# Patient Record
(demographics unavailable — no encounter records)

---

## 2025-02-17 NOTE — RESULTS/DATA
[FreeTextEntry1] : BREAST PATH/RAD REVIEW 9/16/2019 L Dx MG/US: BIRADs 3, Heterogeneously dense - L upper anterior/middle depth scattered grouped micocalcs, round and amorphous, prob benign (Rec 6-month f/u L Dx MG)  - R 12N2 1 cm lobulated mass vs complicated cyst, L 11N1 0.5 cm septated complicated cyst - both probably benign (Rec 6-month f/u BL Dx US)   11/11/2020 BL SC MG/US: BIRADs 2, Stable R medial focal asymmetry, R lateral bx clip marking location of prior benign image guided bx, Stable R 12N2 8 mm mass, no LAD. 11/15/2021 BL SC MG/US: BIRADs 2, L 11N1 0.4 cm cyst, Stable R 12N2 0.8 cm mass, no lymphadenopathy  01/23/2023 BL Dx MG: BIRADs 0, L UOQ middle depth group of calcs (Rec L dx MG and BL Dx US given dense breast)  02/13/2023 L Dx MG/ USL BIRADs 4C, L UOQ- mid depth is a cluster of pleomorphic micocalcs  (Rec Stereo bx)    2/27/2023 L Stereo bx:  1. L UOQ core biopsy: High grade extensive DCIS associated w/ DCIS, papilloma with focal DCIS, papillary apocrine metaplasia and UDH. Concordant, ER 10-15%, [place] 0%. Top Hat Clip.  ** Note: Core biopsies show extensive DCIS w/ cancerization of the lobules and surrounding dense chronic inflammatory infiltrate. There are areas suspicious but not definite for microinvasive carcinoma.   3/17/2023 Breast MRI: - L UIQ biopsy marker w/ associated NME measuring 3.0 cm. Clip at the posterior extent of the NME. (Rec wide surgical excision to include residual calcs)  - No additional suspicious enhancement in left breast, No BL significant axillary or IM LAD.   3/31/2023 s/p L dami Loc PM, L SLNBx (0/2) - PT1mi, pN0 Path (3:00): 1 mm microinvasive carcinoma associated with extensive DCIS. Clear margins.    9/18/2023 L Dx MG/US: BIRADs 2.  HD. New Left 3N2 post-surgical changes, previously seen grouped calcs have been removed. Several benign-type dystrophic calcs seen.   2/5/24 BL Dx MG/US: Birads 2. HD.  - Stable L 3N2 and axillary post-surgical changes.  - Stable R bx clip. Stable R 12N2 0.7 cm hypoechoic nodule, R 2N5 0.9 cm hypoechoic nodule that correlates w/ nodule on MG stable since 2020.   2/6/25 BL Dx MG/US: birads 2. SFGD.  - Stable L post-surgical changes.  - Stable R bx marker. Stable R 12N2 0.9 cm mass since 2021, Stable 2N5 0.9 cm mass since 2024.

## 2025-02-17 NOTE — ASSESSMENT
[FreeTextEntry1] : 64 y/o F w/ left breast DCIS w/ microinvasion +/- dx'ed in 2/2023>pT1mi, pN. s/p L lumpectomy & SLNB. s/p XRT on anastrozole. Here for follow-up.   Age at Presentation: 61 Procedure:  L lumpectomy, L SLNBx 3/31/2023  Pathology: <1 mm microinvasive carcinoma associated with extensive DCIS, clear margins; 0/2 LN Genetics: N/A  Stage: pT1mi pN.   We discussed her clinical breast exam today is unremarkable with no clinical evidence of disease.   I reviewed her most recent breast imaging with no significant findings. - Medical Oncology (Dr. Ashley): On anastrozole for 5 yrs - Radiation Oncology (Dr. Gamino): Completed adjuvant radiation treatment. - Next imaging: B/L Dx MG/US due February 2026 - RTO 1 year. She knows to return sooner if any breast imaging abnormalities or if any breast issues/concerns arise.     complains of pain/discomfort

## 2025-02-17 NOTE — HISTORY OF PRESENT ILLNESS
[de-identified] : 64 y/o F w/ left breast DCIS w/ microinvasion +/- dx'ed in 2023>pT1mi, pN. s/p L lumpectomy & SLNB. s/p XRT on anastrozole. Here for follow-up.    3/31/23 s/p L dami Loc PM, SLNBx (0/2)- L 3:00 microinvasive carcinoma (1 mm) associated with extensive DCIS. Clear margins.   23 - 23 completed adjuvant RT to Left breast with 4240 CGY given in 16 fractions w/ Dr. Allen Gamino. 23 Anastrozole started.  24:    No breast complaints.  tolerating anastrazole.   24 Here for 6-month follow-up. Annual imaging on 2024 birads 2. No new breast complaints.  No systemic symptoms of recurrence.  25 Since last visit has undergone a BL Dx MG/US rated birads 2.   no breast complaints.    Referred by: Dr Anna Ace GYN  PMHx: HTN, Pre-diabetic, HLD  PSHx: BL tubes tied  later had  Hysterectomy  2/2 fibroids  Meds: Simvastatin, Losartan 50 mg QD. Metformin 1,000 mg QD  Allergies: PCN- Hives  Family Hx: No family hx of breast or ovarian cancer  GYN: , menarche age 12, surgical menopause ay 39   Y (1 yrs for each)  Bra size: 34 B  Occupation: Home attendant

## 2025-02-17 NOTE — PHYSICAL EXAM
[Normocephalic] : normocephalic [Atraumatic] : atraumatic [Supple] : supple [No Supraclavicular Adenopathy] : no supraclavicular adenopathy [No Cervical Adenopathy] : no cervical adenopathy [Examined in the supine and seated position] : examined in the supine and seated position [Symmetrical] : symmetrical [No Axillary Lymphadenopathy] : no left axillary lymphadenopathy [Full ROM] : full range of motion [No Rashes] : no rashes [No Ulceration] : no ulceration [de-identified] : Normal respiratory effort [de-identified] : Left breast and axillary surgical incision are well-healed, no parenchymal defect. No palpable mass in either breast. No clinical lymphadenopathy.

## 2025-02-17 NOTE — ASSESSMENT
[FreeTextEntry1] : 64 y/o F w/ left breast DCIS w/ microinvasion +/- dx'ed in 2/2023>pT1mi, pN. s/p L lumpectomy & SLNB. s/p XRT on anastrozole. Here for follow-up.   Age at Presentation: 61 Procedure:  L lumpectomy, L SLNBx 3/31/2023  Pathology: <1 mm microinvasive carcinoma associated with extensive DCIS, clear margins; 0/2 LN Genetics: N/A  Stage: pT1mi pN.   We discussed her clinical breast exam today is unremarkable with no clinical evidence of disease.   I reviewed her most recent breast imaging with no significant findings. - Medical Oncology (Dr. Ashley): On anastrozole for 5 yrs - Radiation Oncology (Dr. Gamino): Completed adjuvant radiation treatment. - Next imaging: B/L Dx MG/US due February 2026 - RTO 1 year. She knows to return sooner if any breast imaging abnormalities or if any breast issues/concerns arise.

## 2025-02-17 NOTE — HISTORY OF PRESENT ILLNESS
[de-identified] : 62 y/o F w/ left breast DCIS w/ microinvasion +/- dx'ed in 2023>pT1mi, pN. s/p L lumpectomy & SLNB. s/p XRT on anastrozole. Here for follow-up.    3/31/23 s/p L dami Loc PM, SLNBx (0/2)- L 3:00 microinvasive carcinoma (1 mm) associated with extensive DCIS. Clear margins.   23 - 23 completed adjuvant RT to Left breast with 4240 CGY given in 16 fractions w/ Dr. Allen Gamino. 23 Anastrozole started.  24:    No breast complaints.  tolerating anastrazole.   24 Here for 6-month follow-up. Annual imaging on 2024 birads 2. No new breast complaints.  No systemic symptoms of recurrence.  25 Since last visit has undergone a BL Dx MG/US rated birads 2.   no breast complaints.    Referred by: Dr Anna Ace GYN  PMHx: HTN, Pre-diabetic, HLD  PSHx: BL tubes tied  later had  Hysterectomy  2/2 fibroids  Meds: Simvastatin, Losartan 50 mg QD. Metformin 1,000 mg QD  Allergies: PCN- Hives  Family Hx: No family hx of breast or ovarian cancer  GYN: , menarche age 12, surgical menopause ay 39   Y (1 yrs for each)  Bra size: 34 B  Occupation: Home attendant

## 2025-02-17 NOTE — RESULTS/DATA
[FreeTextEntry1] : BREAST PATH/RAD REVIEW 9/16/2019 L Dx MG/US: BIRADs 3, Heterogeneously dense - L upper anterior/middle depth scattered grouped micocalcs, round and amorphous, prob benign (Rec 6-month f/u L Dx MG)  - R 12N2 1 cm lobulated mass vs complicated cyst, L 11N1 0.5 cm septated complicated cyst - both probably benign (Rec 6-month f/u BL Dx US)   11/11/2020 BL SC MG/US: BIRADs 2, Stable R medial focal asymmetry, R lateral bx clip marking location of prior benign image guided bx, Stable R 12N2 8 mm mass, no LAD. 11/15/2021 BL SC MG/US: BIRADs 2, L 11N1 0.4 cm cyst, Stable R 12N2 0.8 cm mass, no lymphadenopathy  01/23/2023 BL Dx MG: BIRADs 0, L UOQ middle depth group of calcs (Rec L dx MG and BL Dx US given dense breast)  02/13/2023 L Dx MG/ USL BIRADs 4C, L UOQ- mid depth is a cluster of pleomorphic micocalcs  (Rec Stereo bx)    2/27/2023 L Stereo bx:  1. L UOQ core biopsy: High grade extensive DCIS associated w/ DCIS, papilloma with focal DCIS, papillary apocrine metaplasia and UDH. Concordant, ER 10-15%, [place] 0%. Top Hat Clip.  ** Note: Core biopsies show extensive DCIS w/ cancerization of the lobules and surrounding dense chronic inflammatory infiltrate. There are areas suspicious but not definite for microinvasive carcinoma.   3/17/2023 Breast MRI: - L UIQ biopsy marker w/ associated NME measuring 3.0 cm. Clip at the posterior extent of the NME. (Rec wide surgical excision to include residual calcs)  - No additional suspicious enhancement in left breast, No BL significant axillary or IM LAD.   3/31/2023 s/p L dami Loc PM, L SLNBx (0/2) - PT1mi, pN0 Path (3:00): 1 mm microinvasive carcinoma associated with extensive DCIS. Clear margins.    9/18/2023 L Dx MG/US: BIRADs 2.  HD. New Left 3N2 post-surgical changes, previously seen grouped calcs have been removed. Several benign-type dystrophic calcs seen.   2/5/24 BL Dx MG/US: Birads 2. HD.  - Stable L 3N2 and axillary post-surgical changes.  - Stable R bx clip. Stable R 12N2 0.7 cm hypoechoic nodule, R 2N5 0.9 cm hypoechoic nodule that correlates w/ nodule on MG stable since 2020.   2/6/25 BL Dx MG/US: birads 2. SFGD.  - Stable L post-surgical changes.  - Stable R bx marker. Stable R 12N2 0.9 cm mass since 2021, Stable 2N5 0.9 cm mass since 2024.

## 2025-02-26 NOTE — HISTORY OF PRESENT ILLNESS
[FreeTextEntry1] : 63 year old woman  with microinvasive left breast cancer, s/p lumpectomy/SLNB followed by adjuvant RT completed 6/7/23. She continues on anastrozole.   Interval history  breast pain, breast edema, pruritus, arm edema, fatigue & weight loss   2/6/25 bilateral diagnostic mammogram US : IMPRESSION: No mammographic or sonographic evidence of malignancy.  RECOMMENDATION:  Mammography in 1 year.  BI-RADS 2 - Benign Finding(s)  Care team  med Onc Loli Ashley GYN Anna Ace  surgeon Sylvia Reyes

## 2025-02-26 NOTE — DISEASE MANAGEMENT
[Pathological] : TNM Stage: p [FreeTextEntry4] : left breast ER+/DE- [TTNM] : 1mi [NTNM] : 0 [MTNM] : 0 [IA] : IA

## 2025-03-25 NOTE — REASON FOR VISIT
[Language Line ] : provided by Language Line   [Time Spent: ____ minutes] : Total time spent using  services: [unfilled] minutes. The patient's primary language is not English thus required  services. [Interpreters_IDNumber] : [id number] [Interpreters_FullName] : Gatito [TWNoteComboBox1] : Nauruan

## 2025-03-25 NOTE — PHYSICAL EXAM
[Sclera] : the sclera and conjunctiva were normal [Extraocular Movements] : extraocular movements were intact [Outer Ear] : the ears and nose were normal in appearance [Breast Appearance] : normal in appearance [Symmetric] : breasts are symmetric [Breast Palpation Mass] : no palpable masses [No UE Edema] : there is no upper extremity edema [Cervical Lymph Nodes Enlarged Anterior Bilaterally] : anterior cervical [Supraclavicular Lymph Nodes Enlarged Bilaterally] : supraclavicular [Axillary Lymph Nodes Enlarged Bilaterally] : axillary [Range of Motion to Joints] : range of motion to joints [Normal] : no focal deficits

## 2025-03-25 NOTE — REASON FOR VISIT
[Language Line ] : provided by Language Line   [Time Spent: ____ minutes] : Total time spent using  services: [unfilled] minutes. The patient's primary language is not English thus required  services. [Interpreters_IDNumber] : [id number] [Interpreters_FullName] : Gatito [TWNoteComboBox1] : Rwandan

## 2025-03-25 NOTE — HISTORY OF PRESENT ILLNESS
[FreeTextEntry1] : 63 year old woman  with microinvasive left breast cancer, s/p lumpectomy/SLNB followed by adjuvant RT completed 6/7/23. She continues on anastrozole.  Interval history: Notes intermittent pulling sensation left breast.  No restrictions in range of motion.  Continues with stretching exercises.  No UE swelling noted.  No fatigue. Occasional arthralgias in hands and low back.  No myalgias or bothersome hot flashes. No unintentional weight changes.  2/6/25 bilateral diagnostic mammogram US showed no mammographic or sonographic evidence of malignancy.  BI-RADS 2.  Care team Med Onc Loli Ashley GYN Anna Ace  Breast Surgeon Sylvia Reyes

## 2025-05-09 NOTE — PHYSICAL EXAM
[Chaperoned Physical Exam] : A chaperone was present in the examining room during all aspects of the physical examination. [MA] : MA [FreeTextEntry2] : Patria [Appropriately responsive] : appropriately responsive [Alert] : alert [No Acute Distress] : no acute distress [No Lymphadenopathy] : no lymphadenopathy [Regular Rate Rhythm] : regular rate rhythm [No Murmurs] : no murmurs [Clear to Auscultation B/L] : clear to auscultation bilaterally [Soft] : soft [Non-tender] : non-tender [Non-distended] : non-distended [No HSM] : No HSM [No Lesions] : no lesions [No Mass] : no mass [Oriented x3] : oriented x3 [FreeTextEntry6] : Radiation effect on the left breast. [Examination Of The Breasts] : a normal appearance [No Masses] : no breast masses were palpable [Vulvar Atrophy] : vulvar atrophy [Labia Majora] : normal [Labia Minora] : normal [Atrophy] : atrophy [Absent] : absent [Normal] : normal [Uterine Adnexae] : normal

## 2025-05-09 NOTE — DISCUSSION/SUMMARY
[FreeTextEntry1] : 63-year-old -0-0-3 status post ABBY presents for GYN evaluation.  She gives history of left breast lumpectomy which was cancerous and treated with radiation treatment.  Physical examination is significant for atrophic vulva vagina.  Pap GC chlamydia sent.  Patient to follow-up with breast surgeon for postop treatment and postradiation care.  All questions answered.  Return in 1 year for follow-up.

## 2025-05-09 NOTE — HISTORY OF PRESENT ILLNESS
[Y] : Positive pregnancy history [Menarche Age: ____] : age at menarche was [unfilled] [Menopause Age: ____] : age at menopause was [unfilled] [FreeTextEntry1] : 63-year-old -0-0-3 status post ABBY because of fibroid uterus presents for routine GYN evaluation.  She recently had left breast lumpectomy which was cancerous and was treated with radiation treatment.  She is following up with the breast surgeon.  She denies pelvic pain vaginal bleeding or discharge. [PGHxTotal] : 3 [[facility]] : 3 [PGHxPremature] : 0 [PGHxAbortions] : 0 [[facility] Hospitaliving] : 3 [PGHxABInduced] : 0 [PGHxABSpont] : 0 [PGHxEctopic] : 0 [PGHxMultBirths] : 0

## 2025-06-10 NOTE — REASON FOR VISIT
[Follow-Up Visit] : a follow-up [FreeTextEntry2] : Breast cancer [Interpreters_IDNumber] : [id number] [Interpreters_FullName] : Ania

## 2025-06-10 NOTE — PHYSICAL EXAM
[Fully active, able to carry on all pre-disease performance without restriction] : Status 0 - Fully active, able to carry on all pre-disease performance without restriction [Obese] : obese [Normal] : affect appropriate [de-identified] : Right upper eyelid stye with irritation of the sclera [de-identified] : Post radiation changes left breast

## 2025-06-10 NOTE — HISTORY OF PRESENT ILLNESS
[Disease: _____________________] : Disease: [unfilled] [T: ___] : T[unfilled] [N: ___] : N[unfilled] [M: ___] : M[unfilled] [AJCC Stage: ____] : AJCC Stage: [unfilled] [de-identified] : Left breast cancer at age 61. 01/23/23 Mammogram and breast sonogram showed Left breast calcifications, additional left  diagnostic imaging recommended. BI-RADS 0. 02/13/23 Diagnostic Mammogram and breast US: suspicious cluster of microcalcifications in the upper outer left breast. Stereotactic core biopsy recommended. BI-RADS 4C. 02/27/23 Stereotactic core biopsies of the Left breast, UOQ: extensive DCIS, high nuclear grade, solid pattern with comedo necrosis. Papilloma with focal DCIS, papillary apocrine metaplasia and usual ductal hyperplasia. The DCIS is extensive with cancerization of lobules and surrounding dense chronic inflammation infiltrate. There are areas suspicious but not definite for microinvasion carcinoma. ER 10-15%, [place] 0%. 03/17/23 MRI of the breast showed a biopsy marker in the LUOQ which is associated with non-mass enhancement measuring 3.0 cm in greatest (anteroposterior) dimension, Biopsy markers at the posterior extent of the non mass enhancement. Wide surgical excision is recommended to include the residual calcifications seen on mammography. 03/31/23 Left breast lumpectomy by Dr. Reyes showed <1 mm microinvasive carcinoma associated with extensive DCIS, solid and cribriform pattern with high grade nuclear atypia and central necrosis and 0/2 SLN.  05/16/23 - 06/07/23 Adjuvant radiation to Left breast with 4240 cGY given in 16 fractions by Dr. Allen Gamino. 7/1/23 Anastrozole started. [de-identified] :  <1 mm microinvasive carcinoma in setting of DCIS, high nuclear grade, ER 10-15%, [place] 0%, 0/2 SLN [de-identified] : Sierra started anastrozole on 7/1/23 and is tolerating it fairly well She has a history of OA in her hands with some deformity of her distal phalanx. The pain comes and goes and is manageable. She also gets occasional stiffness and achiness in her neck and lower back, which has been better. She has a rheumatologist who follows her for the OA and recommended to continue with the current regimen. The hot flashes are better in the cooler weather. Vaginal dryness is stable.  HEALTH MAINTENANCE: PCP:  Erum Bravo NP GYN: Anna Ace NP Mammogram and sonogram: 2/06/25 BI-RADS 2  Pap smear: 01/30/23 negative  Bone density DEXA scan: 1/31/23 showed T scores of 0.5 in spine, -0.9 in femoral neck and 0.8 in total hip Colonoscopy: 2021, no polyps